# Patient Record
Sex: MALE | Race: WHITE | NOT HISPANIC OR LATINO
[De-identification: names, ages, dates, MRNs, and addresses within clinical notes are randomized per-mention and may not be internally consistent; named-entity substitution may affect disease eponyms.]

---

## 2018-06-04 PROBLEM — Z00.00 ENCOUNTER FOR PREVENTIVE HEALTH EXAMINATION: Status: ACTIVE | Noted: 2018-06-04

## 2018-06-20 ENCOUNTER — APPOINTMENT (OUTPATIENT)
Dept: CARDIOLOGY | Facility: CLINIC | Age: 28
End: 2018-06-20

## 2018-06-20 DIAGNOSIS — Z78.9 OTHER SPECIFIED HEALTH STATUS: ICD-10-CM

## 2018-06-20 DIAGNOSIS — Z82.49 FAMILY HISTORY OF ISCHEMIC HEART DISEASE AND OTHER DISEASES OF THE CIRCULATORY SYSTEM: ICD-10-CM

## 2018-06-20 DIAGNOSIS — R07.89 OTHER CHEST PAIN: ICD-10-CM

## 2018-06-20 DIAGNOSIS — R79.89 OTHER SPECIFIED ABNORMAL FINDINGS OF BLOOD CHEMISTRY: ICD-10-CM

## 2018-06-20 DIAGNOSIS — Z86.39 PERSONAL HISTORY OF OTHER ENDOCRINE, NUTRITIONAL AND METABOLIC DISEASE: ICD-10-CM

## 2018-06-20 DIAGNOSIS — R94.31 ABNORMAL ELECTROCARDIOGRAM [ECG] [EKG]: ICD-10-CM

## 2018-06-20 RX ORDER — TESTOSTERONE CYPIONATE 200 MG/ML
200 INJECTION, SOLUTION INTRAMUSCULAR
Refills: 0 | Status: ACTIVE | COMMUNITY

## 2018-07-25 ENCOUNTER — APPOINTMENT (OUTPATIENT)
Dept: CARDIOLOGY | Facility: CLINIC | Age: 28
End: 2018-07-25

## 2019-09-24 ENCOUNTER — TRANSCRIPTION ENCOUNTER (OUTPATIENT)
Age: 29
End: 2019-09-24

## 2021-02-05 ENCOUNTER — TRANSCRIPTION ENCOUNTER (OUTPATIENT)
Age: 31
End: 2021-02-05

## 2022-10-11 ENCOUNTER — EMERGENCY (EMERGENCY)
Facility: HOSPITAL | Age: 32
LOS: 0 days | Discharge: HOME | End: 2022-10-11
Attending: EMERGENCY MEDICINE | Admitting: EMERGENCY MEDICINE

## 2022-10-11 VITALS
OXYGEN SATURATION: 98 % | WEIGHT: 195.11 LBS | RESPIRATION RATE: 20 BRPM | TEMPERATURE: 97 F | HEIGHT: 65 IN | HEART RATE: 80 BPM | SYSTOLIC BLOOD PRESSURE: 119 MMHG | DIASTOLIC BLOOD PRESSURE: 86 MMHG
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
## 2022-10-11 DIAGNOSIS — M79.671 PAIN IN RIGHT FOOT: ICD-10-CM
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
## 2022-10-11 PROCEDURE — 99283 EMERGENCY DEPT VISIT LOW MDM: CPT

## 2022-10-11 PROCEDURE — 73630 X-RAY EXAM OF FOOT: CPT | Mod: 26,RT

## 2022-10-11 RX ORDER — IBUPROFEN 200 MG
600 TABLET ORAL ONCE
Refills: 0 | Status: COMPLETED | OUTPATIENT
Start: 2022-10-11 | End: 2022-10-11

## 2022-10-11 RX ADMIN — Medication 600 MILLIGRAM(S): at 04:09

## 2022-10-11 NOTE — ED ADULT TRIAGE NOTE - CHIEF COMPLAINT QUOTE
Pt. complains of right foot (sole/heel) pain. Pt. states that right foot began sore yesterday, however is painful now

## 2022-10-11 NOTE — ED PROVIDER NOTE - PATIENT PORTAL LINK FT
You can access the FollowMyHealth Patient Portal offered by [facility] by registering at the following website: [email or web address]. By joining [facility]’s FollowMyHealth portal, you will also be able to view your health information using other applications (apps) compatible with our system.

## 2022-10-11 NOTE — ED PROVIDER NOTE - CLINICAL SUMMARY MEDICAL DECISION MAKING FREE TEXT BOX
Sx consistent with plantar fasciitis, no fx or dislocation on XR, no signs of infected joint. Will dc with ortho follow up, analgesia.

## 2022-10-11 NOTE — ED PROVIDER NOTE - PHYSICAL EXAMINATION
VITAL SIGNS: I have reviewed nursing notes and confirm.  CONSTITUTIONAL: Well-developed; well-nourished; in no acute distress.  SKIN: Skin exam is warm and dry, no acute rash.  HEAD: Normocephalic; atraumatic.  EYES: PERRL, EOM intact; conjunctiva and sclera clear.  ENT: No nasal discharge; airway clear.   NECK/BACK: no midline CTL spine ttp or step off  CARD: S1, S2 normal; no murmurs, gallops, or rubs. Regular rate and rhythm.  RESP: No wheezes, rales or rhonchi.  ABD: Normal bowel sounds; soft; non-distended; non-tender  EXT: Normal ROM. no bruising, swelling. ttp over medial arch, heel on R  NEURO: Alert, oriented. Grossly unremarkable. No focal deficits.  PSYCH: Cooperative, appropriate.

## 2022-10-11 NOTE — ED PROVIDER NOTE - NSFOLLOWUPINSTRUCTIONS_ED_ALL_ED_FT
Take ibuprofen 600mg every 8 hours until you are able to be seen by ortho or podiatry.    Roll a frozen water bottle under your foot as discussed as often as possible.      Plantar Fasciitis       Plantar fasciitis is a painful foot condition that affects the heel. It occurs when the band of tissue that connects the toes to the heel bone (plantar fascia) becomes irritated. This can happen as the result of exercising too much or doing other repetitive activities (overuse injury).    Plantar fasciitis can cause mild irritation to severe pain that makes it difficult to walk or move. The pain is usually worse in the morning after sleeping, or after sitting or lying down for a period of time. Pain may also be worse after long periods of walking or standing.      What are the causes?    This condition may be caused by:  •Standing for long periods of time.      •Wearing shoes that do not have good arch support.      •Doing activities that put stress on joints (high-impact activities). This includes ballet and exercise that makes your heart beat faster (aerobic exercise), such as running.      •Being overweight.      •An abnormal way of walking (gait).      •Tight muscles in the back of your lower leg (calf).      •High arches in your feet or flat feet.      •Starting a new athletic activity.        What are the signs or symptoms?    The main symptom of this condition is heel pain. Pain may get worse after the following:  •Taking the first steps after a time of rest, especially in the morning after awakening, or after you have been sitting or lying down for a while.      •Long periods of standing still.      Pain may decrease after 30–45 minutes of activity, such as gentle walking.      How is this diagnosed?    This condition may be diagnosed based on your medical history, a physical exam, and your symptoms. Your health care provider will check for:  •A tender area on the bottom of your foot.      •A high arch in your foot or flat feet.      •Pain when you move your foot.      •Difficulty moving your foot.      You may have imaging tests to confirm the diagnosis, such as:  •X-rays.      •Ultrasound.      •MRI.        How is this treated?    Treatment for plantar fasciitis depends on how severe your condition is. Treatment may include:  •Rest, ice, pressure (compression), and raising (elevating) the affected foot. This is called RICE therapy. Your health care provider may recommend RICE therapy along with over-the-counter pain medicines to manage your pain.      •Exercises to stretch your calves and your plantar fascia.      •A splint that holds your foot in a stretched, upward position while you sleep (night splint).      •Physical therapy to relieve symptoms and prevent problems in the future.      •Injections of steroid medicine (cortisone) to relieve pain and inflammation.      •Stimulating your plantar fascia with electrical impulses (extracorporeal shock wave therapy). This is usually the last treatment option before surgery.      •Surgery, if other treatments have not worked after 12 months.        Follow these instructions at home:      Managing pain, stiffness, and swelling    •If directed, put ice on the painful area. To do this:  •Put ice in a plastic bag, or use a frozen bottle of water.      •Place a towel between your skin and the bag or bottle.      •Roll the bottom of your foot over the bag or bottle.      •Do this for 20 minutes, 2–3 times a day.        •Wear athletic shoes that have air-sole or gel-sole cushions, or try soft shoe inserts that are designed for plantar fasciitis.      •Elevate your foot above the level of your heart while you are sitting or lying down.      Activity     •Avoid activities that cause pain. Ask your health care provider what activities are safe for you.      •Do physical therapy exercises and stretches as told by your health care provider.      •Try activities and forms of exercise that are easier on your joints (low impact). Examples include swimming, water aerobics, and biking.      General instructions     •Take over-the-counter and prescription medicines only as told by your health care provider.      •Wear a night splint while sleeping, if told by your health care provider. Loosen the splint if your toes tingle, become numb, or turn cold and blue.      •Maintain a healthy weight, or work with your health care provider to lose weight as needed.      •Keep all follow-up visits. This is important.        Contact a health care provider if you have:    •Symptoms that do not go away with home treatment.      •Pain that gets worse.      •Pain that affects your ability to move or do daily activities.        Summary    •Plantar fasciitis is a painful foot condition that affects the heel. It occurs when the band of tissue that connects the toes to the heel bone (plantar fascia) becomes irritated.      •Heel pain is the main symptom of this condition. It may get worse after exercising too much or standing still for a long time.      •Treatment varies, but it usually starts with rest, ice, pressure (compression), and raising (elevating) the affected foot. This is called RICE therapy. Over-the-counter medicines can also be used to manage pain.      This information is not intended to replace advice given to you by your health care provider. Make sure you discuss any questions you have with your health care provider.

## 2022-10-11 NOTE — ED ADULT NURSE NOTE - OBJECTIVE STATEMENT
Pt. complains of right foot (sole/heel) pain. Pt. states that right foot began sore yesterday, however is painful now and it woke him up out of his sleep.

## 2022-12-07 ENCOUNTER — NON-APPOINTMENT (OUTPATIENT)
Age: 32
End: 2022-12-07

## 2023-03-19 ENCOUNTER — NON-APPOINTMENT (OUTPATIENT)
Age: 33
End: 2023-03-19

## 2023-05-10 ENCOUNTER — RESULT REVIEW (OUTPATIENT)
Age: 33
End: 2023-05-10

## 2023-05-10 ENCOUNTER — APPOINTMENT (OUTPATIENT)
Dept: PODIATRY | Facility: CLINIC | Age: 33
End: 2023-05-10
Payer: MEDICAID

## 2023-05-10 ENCOUNTER — OUTPATIENT (OUTPATIENT)
Dept: OUTPATIENT SERVICES | Facility: HOSPITAL | Age: 33
LOS: 1 days | End: 2023-05-10
Payer: MEDICAID

## 2023-05-10 VITALS
OXYGEN SATURATION: 98 % | HEART RATE: 83 BPM | BODY MASS INDEX: 37.49 KG/M2 | TEMPERATURE: 96.9 F | HEIGHT: 65 IN | WEIGHT: 225 LBS

## 2023-05-10 DIAGNOSIS — M72.2 PLANTAR FASCIAL FIBROMATOSIS: ICD-10-CM

## 2023-05-10 PROCEDURE — 73630 X-RAY EXAM OF FOOT: CPT | Mod: 26,RT

## 2023-05-10 PROCEDURE — 73630 X-RAY EXAM OF FOOT: CPT | Mod: RT

## 2023-05-10 PROCEDURE — 99204 OFFICE O/P NEW MOD 45 MIN: CPT

## 2023-05-11 DIAGNOSIS — M72.2 PLANTAR FASCIAL FIBROMATOSIS: ICD-10-CM

## 2023-05-18 ENCOUNTER — NON-APPOINTMENT (OUTPATIENT)
Age: 33
End: 2023-05-18

## 2023-05-22 ENCOUNTER — APPOINTMENT (OUTPATIENT)
Dept: PODIATRY | Facility: CLINIC | Age: 33
End: 2023-05-22

## 2023-05-26 RX ORDER — PREDNISONE 10 MG/1
10 TABLET ORAL
Qty: 20 | Refills: 0 | Status: ACTIVE | COMMUNITY
Start: 2023-05-10 | End: 1900-01-01

## 2023-05-26 NOTE — PHYSICAL EXAM
[2+] : left foot dorsalis pedis 2+ [No Joint Swelling] : no joint swelling [Normal Foot/Ankle] : Both lower extremities were exposed and visualized. Standing exam demonstrates normal foot posture and alignment. Hindfoot exam shows no hindfoot valgus or varus [Skin Color & Pigmentation] : normal skin color and pigmentation [Skin Turgor] : normal skin turgor [Skin Lesions] : no skin lesions [Sensation] : the sensory exam was normal to light touch and pinprick [No Focal Deficits] : no focal deficits [Deep Tendon Reflexes (DTR)] : deep tendon reflexes were 2+ and symmetric [Motor Exam] : the motor exam was normal [Oriented To Time, Place, And Person] : oriented to person, place, and time [Impaired Insight] : insight and judgment were intact [Affect] : the affect was normal [Ankle Swelling (On Exam)] : not present [Varicose Veins Of Lower Extremities] : not present [] : not present [Delayed in the Right Toes] : capillary refills normal in right toes [Delayed in the Left Toes] : capillary refills normal in the left toes [de-identified] : Right foot pain at medial plantar tubercle [Foot Ulcer] : no foot ulcer [Skin Induration] : no skin induration [FreeTextEntry1] : Soft tissue edema right foot

## 2023-05-26 NOTE — PROCEDURE
[FreeTextEntry1] : Patient evaluated history reviewed discussed plantar fascia pain and plantar fasciitis in detail patient should obtain right foot x-rays rule out heel spur discussed possible treatment plans using oral medication NSAID versus steroids localized injections and or if pain does not relieve patient will require surgical intervention\par \par Patient will try to remain off loading right lower extremity we will continue to use crutches patient can follow-up in 2 weeks

## 2023-05-26 NOTE — HISTORY OF PRESENT ILLNESS
[FreeTextEntry1] : 33-year-old male with no past medical history\par \par Patient states that he has gained about 30 pounds in the last 3 months and is now having right foot pain\par \par \par Patient has been told in the past he has plantar fasciitis and was treated with stretching exercises\par \par Patient states he has been trying to get back into the gym and has been doing a lot of running\par \par Patient denies any acute injury

## 2023-06-19 ENCOUNTER — APPOINTMENT (OUTPATIENT)
Dept: PODIATRY | Facility: CLINIC | Age: 33
End: 2023-06-19

## 2023-08-05 ENCOUNTER — OUTPATIENT (OUTPATIENT)
Dept: OUTPATIENT SERVICES | Facility: HOSPITAL | Age: 33
LOS: 1 days | End: 2023-08-05
Payer: MEDICAID

## 2023-08-05 DIAGNOSIS — Z00.8 ENCOUNTER FOR OTHER GENERAL EXAMINATION: ICD-10-CM

## 2023-08-05 DIAGNOSIS — R10.9 UNSPECIFIED ABDOMINAL PAIN: ICD-10-CM

## 2023-08-05 PROCEDURE — 76857 US EXAM PELVIC LIMITED: CPT

## 2023-08-05 PROCEDURE — 76857 US EXAM PELVIC LIMITED: CPT | Mod: 26

## 2023-08-05 PROCEDURE — 76700 US EXAM ABDOM COMPLETE: CPT

## 2023-08-05 PROCEDURE — 76700 US EXAM ABDOM COMPLETE: CPT | Mod: 26

## 2023-08-06 DIAGNOSIS — R10.9 UNSPECIFIED ABDOMINAL PAIN: ICD-10-CM

## 2024-01-07 ENCOUNTER — NON-APPOINTMENT (OUTPATIENT)
Age: 34
End: 2024-01-07

## 2024-03-06 ENCOUNTER — EMERGENCY (EMERGENCY)
Facility: HOSPITAL | Age: 34
LOS: 0 days | Discharge: HOME | End: 2024-03-07
Attending: EMERGENCY MEDICINE
Payer: MEDICAID

## 2024-03-06 VITALS
DIASTOLIC BLOOD PRESSURE: 84 MMHG | SYSTOLIC BLOOD PRESSURE: 138 MMHG | OXYGEN SATURATION: 96 % | TEMPERATURE: 98 F | HEART RATE: 97 BPM | WEIGHT: 216.93 LBS | RESPIRATION RATE: 18 BRPM

## 2024-03-06 DIAGNOSIS — M72.2 PLANTAR FASCIAL FIBROMATOSIS: ICD-10-CM

## 2024-03-06 PROCEDURE — 96372 THER/PROPH/DIAG INJ SC/IM: CPT

## 2024-03-06 PROCEDURE — 99283 EMERGENCY DEPT VISIT LOW MDM: CPT | Mod: 25

## 2024-03-06 PROCEDURE — 99283 EMERGENCY DEPT VISIT LOW MDM: CPT

## 2024-03-06 RX ORDER — IBUPROFEN 200 MG
1 TABLET ORAL
Qty: 28 | Refills: 0
Start: 2024-03-06 | End: 2024-03-12

## 2024-03-06 RX ORDER — METHOCARBAMOL 500 MG/1
1500 TABLET, FILM COATED ORAL ONCE
Refills: 0 | Status: COMPLETED | OUTPATIENT
Start: 2024-03-06 | End: 2024-03-06

## 2024-03-06 RX ORDER — KETOROLAC TROMETHAMINE 30 MG/ML
30 SYRINGE (ML) INJECTION ONCE
Refills: 0 | Status: DISCONTINUED | OUTPATIENT
Start: 2024-03-06 | End: 2024-03-06

## 2024-03-06 RX ORDER — METHOCARBAMOL 500 MG/1
2 TABLET, FILM COATED ORAL
Qty: 18 | Refills: 0
Start: 2024-03-06 | End: 2024-03-08

## 2024-03-06 NOTE — ED PROVIDER NOTE - PHYSICAL EXAMINATION
VITAL SIGNS: I have reviewed nursing notes and confirm.  CONSTITUTIONAL: Well-developed; well-nourished; in no acute distress.  SKIN: Skin exam is warm and dry, no acute rash.  HEAD: Normocephalic; atraumatic.  EYES: PERRL, EOM intact; conjunctiva and sclera clear.  ENT: No nasal discharge; airway clear.  NECK: Supple; non tender.  CARD: S1, S2 normal; no murmurs, gallops, or rubs. Regular rate and rhythm.  RESP: No wheezes, rales or rhonchi.  ABD: Normal bowel sounds; soft; non-distended; non-tender  EXT: Normal ROM, ttp over medial R mid foot on plantar surface and R heel, no calf ttp or swelling, good pulses  NEURO: Alert, oriented. Grossly unremarkable. No focal deficits.  PSYCH: Cooperative, appropriate.

## 2024-03-06 NOTE — ED PROVIDER NOTE - CLINICAL SUMMARY MEDICAL DECISION MAKING FREE TEXT BOX
Sx consistent with exacerbation of known plantar fascitis. No signs of fracture, dislocation, DVT based on hx or exam.    Advised on continued podiatry follow up, use of brace, NSAIDs, sx monitoring, return precautions.

## 2024-03-06 NOTE — ED PROVIDER NOTE - PATIENT PORTAL LINK FT
You can access the FollowMyHealth Patient Portal offered by [facility] by registering at the following website: [email or web address]. By joining [facility]’s FollowMyHealth portal, you will also be able to view your health information using other applications (apps) compatible with our system.

## 2024-03-06 NOTE — ED PROVIDER NOTE - OBJECTIVE STATEMENT
34 yo M, hx of plantar fascitis here or assessment of self described exacerbation of these symptoms, pain in R foot, extending up towards calf, preventing him from sleeping. No recent injury or activity, does note he has been driving more. Took ibuprofen at home without significant improvement.    Formerly was wearing a brace and using ice roller for this but stopped. Has previously followed up with podiatry but not in the last 6 months.

## 2024-03-06 NOTE — ED PROVIDER NOTE - NSFOLLOWUPINSTRUCTIONS_ED_ALL_ED_FT
Follow up with your podiatrist.    Wear the brace you have at home or this condition.    Roll your foot on a frozen water bottle as you were instructed.      Plantar Fasciitis    Plantar fasciitis is a painful foot condition that affects the heel. It occurs when the band of tissue that connects the toes to the heel bone (plantar fascia) becomes irritated. This can happen as the result of exercising too much or doing other repetitive activities (overuse injury).    Plantar fasciitis can cause mild irritation to severe pain that makes it difficult to walk or move. The pain is usually worse in the morning after sleeping, or after sitting or lying down for a period of time. Pain may also be worse after long periods of walking or standing.    What are the causes?  This condition may be caused by:  Standing for long periods of time.  Wearing shoes that do not have good arch support.  Doing activities that put stress on joints (high-impact activities). This includes ballet and exercise that makes your heart beat faster (aerobic exercise), such as running.  Being overweight.  An abnormal way of walking (gait).  Tight muscles in the back of your lower leg (calf).  High arches in your feet or flat feet.  Starting a new athletic activity.  What are the signs or symptoms?  The main symptom of this condition is heel pain. Pain may get worse after the following:  Taking the first steps after a time of rest, especially in the morning after awakening, or after you have been sitting or lying down for a while.  Long periods of standing still.  Pain may decrease after 30–45 minutes of activity, such as gentle walking.    How is this diagnosed?  This condition may be diagnosed based on your medical history, a physical exam, and your symptoms. Your health care provider will check for:  A tender area on the bottom of your foot.  A high arch in your foot or flat feet.  Pain when you move your foot.  Difficulty moving your foot.  You may have imaging tests to confirm the diagnosis, such as:  X-rays.  Ultrasound.  MRI.  How is this treated?  Treatment for plantar fasciitis depends on how severe your condition is. Treatment may include:  Rest, ice, pressure (compression), and raising (elevating) the affected foot. This is called RICE therapy. Your health care provider may recommend RICE therapy along with over-the-counter pain medicines to manage your pain.  Exercises to stretch your calves and your plantar fascia.  A splint that holds your foot in a stretched, upward position while you sleep (night splint).  Physical therapy to relieve symptoms and prevent problems in the future.  Injections of steroid medicine (cortisone) to relieve pain and inflammation.  Stimulating your plantar fascia with electrical impulses (extracorporeal shock wave therapy). This is usually the last treatment option before surgery.  Surgery, if other treatments have not worked after 12 months.  Follow these instructions at home:  Managing pain, stiffness, and swelling      If directed, put ice on the painful area. To do this:  Put ice in a plastic bag, or use a frozen bottle of water.  Place a towel between your skin and the bag or bottle.  Roll the bottom of your foot over the bag or bottle.  Do this for 20 minutes, 2–3 times a day.  Wear athletic shoes that have air-sole or gel-sole cushions, or try soft shoe inserts that are designed for plantar fasciitis.  Elevate your foot above the level of your heart while you are sitting or lying down.  Activity    Avoid activities that cause pain. Ask your health care provider what activities are safe for you.  Do physical therapy exercises and stretches as told by your health care provider.  Try activities and forms of exercise that are easier on your joints (low impact). Examples include swimming, water aerobics, and biking.  General instructions    Take over-the-counter and prescription medicines only as told by your health care provider.  Wear a night splint while sleeping, if told by your health care provider. Loosen the splint if your toes tingle, become numb, or turn cold and blue.  Maintain a healthy weight, or work with your health care provider to lose weight as needed.  Keep all follow-up visits. This is important.  Contact a health care provider if you have:  Symptoms that do not go away with home treatment.  Pain that gets worse.  Pain that affects your ability to move or do daily activities.  Summary  Plantar fasciitis is a painful foot condition that affects the heel. It occurs when the band of tissue that connects the toes to the heel bone (plantar fascia) becomes irritated.  Heel pain is the main symptom of this condition. It may get worse after exercising too much or standing still for a long time.  Treatment varies, but it usually starts with rest, ice, pressure (compression), and raising (elevating) the affected foot. This is called RICE therapy. Over-the-counter medicines can also be used to manage pain.  This information is not intended to replace advice given to you by your health care provider. Make sure you discuss any questions you have with your health care provider.

## 2024-03-07 RX ADMIN — METHOCARBAMOL 1500 MILLIGRAM(S): 500 TABLET, FILM COATED ORAL at 00:02

## 2024-03-07 RX ADMIN — Medication 30 MILLIGRAM(S): at 00:02

## 2024-03-07 NOTE — ED ADULT NURSE NOTE - NSFALLUNIVINTERV_ED_ALL_ED
Bed/Stretcher in lowest position, wheels locked, appropriate side rails in place/Call bell, personal items and telephone in reach/Instruct patient to call for assistance before getting out of bed/chair/stretcher/Non-slip footwear applied when patient is off stretcher/[place] to call system/Physically safe environment - no spills, clutter or unnecessary equipment/Purposeful proactive rounding/Room/bathroom lighting operational, light cord in reach

## 2024-03-19 ENCOUNTER — APPOINTMENT (OUTPATIENT)
Dept: PODIATRY | Facility: CLINIC | Age: 34
End: 2024-03-19
Payer: MEDICAID

## 2024-03-19 VITALS — HEIGHT: 65 IN | BODY MASS INDEX: 36.99 KG/M2 | WEIGHT: 222 LBS

## 2024-03-19 PROCEDURE — 20550 NJX 1 TENDON SHEATH/LIGAMENT: CPT | Mod: RT

## 2024-03-19 RX ORDER — TRIAMCINOLONE ACETONIDE 10 MG/ML
10 INJECTION, SUSPENSION INTRA-ARTICULAR; INTRALESIONAL
Qty: 1 | Refills: 0 | Status: COMPLETED | OUTPATIENT
Start: 2024-03-19

## 2024-03-19 RX ORDER — BETAMETHASONE ACETATE AND BETAMETHASONE SODIUM PHOSPHATE 3; 3 MG/ML; MG/ML
6 (3-3) INJECTION, SUSPENSION INTRA-ARTICULAR; INTRALESIONAL; INTRAMUSCULAR; SOFT TISSUE
Qty: 1 | Refills: 0 | Status: COMPLETED | OUTPATIENT
Start: 2024-03-19

## 2024-03-19 RX ADMIN — TRIAMCINOLONE ACETONIDE 0 MG/ML: 10 INJECTION, SUSPENSION INTRA-ARTICULAR; INTRALESIONAL at 00:00

## 2024-03-20 NOTE — PHYSICAL EXAM
[2+] : right foot dorsalis pedis 2+ [No Joint Swelling] : no joint swelling [de-identified] : pain on palpation of medial plantar heel and along the medial band of plantar fascia. [] : normal strength/tone

## 2024-03-20 NOTE — HISTORY OF PRESENT ILLNESS
[FreeTextEntry1] : R heel pain - Started a few days ago - Pain with ambulation and with 1st step after prolonged rest - uses a Cane due to pain - hx of heel pain in the past - went away with PO Meds

## 2024-03-20 NOTE — ASSESSMENT
[FreeTextEntry1] : Rx NSAID  Injection to plantar fascia R foot - See note above Stretching exercise Supportive shoes RTO 6 weeks

## 2024-03-20 NOTE — PROCEDURE
[Plantar Fascia Injection] : ~M plantar fascia (heel) injection [Right Foot] : was performed on the right foot [Patient] : the patient [Therapeutic] : therapeutic [Risks] : risks [Benefits] : benefits [Alternatives] : alternatives [Ethyl Chloride] : ethyl chloride [___ ml Inj] : [unfilled] ~Uml [1%] : 1%  [Without Epi] : without epinephrine [Alcohol] : alcohol [27 gauge] : A 27 gauge needle was used [Kenalog 10] : Kenalog 10 [Betamethasone] : Betamethasone [Tolerated Well] : tolerated the procedure well [No Complications] : There were no complications. [de-identified] : 1cc of each

## 2024-04-30 ENCOUNTER — APPOINTMENT (OUTPATIENT)
Dept: PODIATRY | Facility: CLINIC | Age: 34
End: 2024-04-30
Payer: MEDICAID

## 2024-04-30 VITALS — BODY MASS INDEX: 36.65 KG/M2 | HEIGHT: 65 IN | WEIGHT: 220 LBS

## 2024-04-30 PROCEDURE — 99213 OFFICE O/P EST LOW 20 MIN: CPT

## 2024-04-30 RX ORDER — MELOXICAM 15 MG/1
15 TABLET ORAL
Qty: 15 | Refills: 3 | Status: ACTIVE | COMMUNITY
Start: 2023-05-10 | End: 1900-01-01

## 2024-04-30 NOTE — PROCEDURE
[Without Epi] : without epinephrine [Alcohol] : alcohol [27 gauge] : A 27 gauge needle was used [Betamethasone] : Betamethasone [de-identified] : 1cc of each

## 2024-04-30 NOTE — PHYSICAL EXAM
[2+] : left foot dorsalis pedis 2+ [No Joint Swelling] : no joint swelling [] : normal strength/tone [de-identified] : pain on palpation of medial plantar heel and along the medial band of plantar fascia.

## 2024-05-14 ENCOUNTER — APPOINTMENT (OUTPATIENT)
Dept: PODIATRY | Facility: CLINIC | Age: 34
End: 2024-05-14
Payer: MEDICAID

## 2024-05-14 VITALS
TEMPERATURE: 97 F | OXYGEN SATURATION: 98 % | BODY MASS INDEX: 36.65 KG/M2 | HEIGHT: 65 IN | HEART RATE: 76 BPM | WEIGHT: 220 LBS

## 2024-05-14 DIAGNOSIS — M79.671 PAIN IN RIGHT FOOT: ICD-10-CM

## 2024-05-14 DIAGNOSIS — M72.2 PLANTAR FASCIAL FIBROMATOSIS: ICD-10-CM

## 2024-05-14 PROCEDURE — 99213 OFFICE O/P EST LOW 20 MIN: CPT

## 2024-05-14 PROCEDURE — G2211 COMPLEX E/M VISIT ADD ON: CPT | Mod: NC,1L

## 2024-05-14 NOTE — HISTORY OF PRESENT ILLNESS
[FreeTextEntry1] : R heel pain  Patient states he no longer is feeling acute heel pain patient states the pain has dissipated a lot more and he is able to ambulate without pain

## 2024-05-14 NOTE — PHYSICAL EXAM
[2+] : left foot dorsalis pedis 2+ [No Joint Swelling] : no joint swelling [] : normal strength/tone [de-identified] : pain on palpation of medial plantar heel and along the medial band of plantar fascia however a lot less than normal

## 2024-05-14 NOTE — ASSESSMENT
[FreeTextEntry1] : Patient evaluated history reviewed Patient to begin physical therapy for right foot plantar fasciitis pain Patient can take NSAIDs as needed there were prescribed last clinical visit Discussed with patient the nature of plantar fasciitis and how he is out of acute phase inflammation which would be best for him to begin physical therapy at this time Patient is in agreement and will follow-up with physical therapy Patient can return as needed

## 2024-05-14 NOTE — PHYSICAL EXAM
[2+] : left foot dorsalis pedis 2+ [No Joint Swelling] : no joint swelling [] : normal strength/tone [de-identified] : pain on palpation of medial plantar heel and along the medial band of plantar fascia however a lot less than normal

## 2024-08-20 ENCOUNTER — APPOINTMENT (OUTPATIENT)
Dept: PODIATRY | Facility: CLINIC | Age: 34
End: 2024-08-20

## 2025-02-28 ENCOUNTER — NON-APPOINTMENT (OUTPATIENT)
Age: 35
End: 2025-02-28

## 2025-06-23 NOTE — ED ADULT NURSE NOTE - HOW OFTEN DO YOU HAVE A DRINK CONTAINING ALCOHOL?
Patient is returning am missed call from last Friday regarding biopsy results.Please advise      Never